# Patient Record
Sex: FEMALE | Race: OTHER
[De-identification: names, ages, dates, MRNs, and addresses within clinical notes are randomized per-mention and may not be internally consistent; named-entity substitution may affect disease eponyms.]

---

## 2018-01-15 NOTE — EMERGENCY ROOM REPORT
History of Present Illness


General


Chief Complaint:  Skin Rash/Abscess


Source:  Patient





Present Illness


HPI


16 YO Female presents to the ED c/o pain, swelling, and erythema  of lateral 

ear lobe status post recent ear piercing last week with inability to visualize 

tearing from both lobes.  Patient reports unable to digitalize since yesterday. 

Denies fevers, chills, discharge.  Patient reports bleeding.  Denies CP, 

Palpitations, LOC, AMS, dizziness, Changes in Vision, Sensation, paresthesias, 

or a sudden severe headache.


Allergies:  


Coded Allergies:  


     No Known Allergies (Unverified , 1/15/18)





Patient History


Past Medical History:  see triage record


Past Surgical History:  none


Pertinent Family History:  none


Immunizations:  UTD


Reviewed Nursing Documentation:  PMH: Agreed, PSxH: Agreed





Nursing Documentation-PMH


Past Medical History:  No Stated History





Review of Systems


All Other Systems:  negative except mentioned in HPI





Physical Exam





Vital Signs








  Date Time  Temp Pulse Resp B/P (MAP) Pulse Ox O2 Delivery O2 Flow Rate FiO2


 


1/15/18 15:29 98.6 57 20 117/66 99   








Sp02 EP Interpretation:  reviewed, normal


General Appearance:  no apparent distress, alert, GCS 15, non-toxic


Head:  normocephalic, atraumatic


Eyes:  bilateral eye normal inspection, bilateral eye PERRL


ENT:  hearing grossly normal, normal voice, other - swelling, erythema, 

tenderness, and palpable fb in the Soft tissue of the bilateral earlobes.


Neck:  full range of motion


Respiratory:  lungs clear, normal breath sounds, speaking full sentences


Cardiovascular #1:  regular rate, rhythm


Rectal:  deferred


Musculoskeletal:  back normal, gait/station normal, normal range of motion, non-

tender


Neurologic:  alert, oriented x3, responsive, motor strength/tone normal, 

sensory intact, normal gait, speech normal, grossly normal


Psychiatric:  judgement/insight normal


Skin:  normal color, no rash, warm/dry, well hydrated, other - swelling, 

erythema, tenderness, and palpable fb in the Soft tissue of the bilateral 

earlobes.


Lymphatic:  no adenopathy





Procedures


Additional Procedure


Procedure Narrative


- Verbal consent was obtained for fb removal from the bilateral earlobes. 


-EMLA  cream applied topically to the bilateral lobes


-Both earrings were removed from soft tissue using hemostats from suture kit no 

need for incisions.


-Pt. tolerate procedure well. 


- No complications





Medical Decision Making


PA Attestation


Dr. iraheta is my supervising Physician whom patient management has been 

discussed with.


Diagnostic Impression:  


 Primary Impression:  


 Foreign body in ear lobe


 Qualified Codes:  S00.459A - Superficial foreign body of unspecified ear, 

initial encounter


 Additional Impression:  


 Cellulitis


 Qualified Codes:  L03.818 - Cellulitis of other sites


ER Course


16 YO Female presents to the ED c/o pain, swelling, and erythema  of lateral 

ear lobe status post recent ear piercing last week with inability to visualize 

tearing from both lobes.  Patient reports unable to digitalize since yesterday. 

Denies fevers, chills, discharge.  Patient reports bleeding.  Denies CP, 

Palpitations, LOC, AMS, dizziness, Changes in Vision, Sensation, paresthesias, 

or a sudden severe headache.





Ddx considered but are not limited to cellulitis, abscess, cystic acne, 

necrotizing fasciitis, insect bite, soft tissue fb just to name a few. 





Vital signs: are WNL, pt. is afebrile





H&PE are most consistent with  soft tissue foreign body to the bilateral 

earlobes. 





ORDERS: none required at this time, the diagnosis is clinical





ED INTERVENTIONS:


-Pain meds


-EMLA  cream applied topically to the bilateral lobes


-Both earrings were removed from soft tissue using hemostats from suture kit no 

need for incisions. 


-Bacitracin applied by RN. 





DISCHARGE: At this time pt. is stable for d/c to home. Will provide printed 

patient care instructions, and any necessary prescriptions. Care plan and 

follow up instructions have been discussed with the patient prior to discharge.





Last Vital Signs








  Date Time  Temp Pulse Resp B/P (MAP) Pulse Ox O2 Delivery O2 Flow Rate FiO2


 


1/15/18 15:40 98.6  20 117/66 99   


 


1/15/18 15:29  57      








Disposition:  HOME, SELF-CARE


Condition:  Stable


Scripts


Cephalexin* (KEFLEX*) 500 Mg Capsule


500 MG ORAL EVERY 12 HOURS for 7 Days, #14 CAP 0 Refills


   Prov: Allyssa Mendenhall.GERARD         1/15/18 


Bacitracin/Polymyxin B Sulfate (BACITRACIN-POLYMYXIN OINTMENT) 28.35 Gm 

Oint...g.


1 APPLIC TP BID, #28.3 GM


   Prov: Allyssa Mendenhall         1/15/18


Referrals:  


[facility] (PCP)


Patient Instructions:  Cellulitis, Easy-to-Read





Additional Instructions:  


Take medications as directed. 


 ** Follow up with a Primary Care Provider in 3-5 days, even if your symptoms 

have resolved. ** 


--Please review list of primary care clinics, if you do not already have a 

primary care provider





Return sooner to ED if new symptoms occur, or current symptoms become worse. 











- Please note that this Emergency Department Report was dictated using [employer] technology software, occasionally this can lead to 

erroneous entry secondary to interpretation by the dictation equipment.











Allyssa Mendenhall James 15, 2018 16:47

## 2018-07-12 NOTE — EMERGENCY ROOM REPORT
History of Present Illness


General


Chief Complaint:  Earache


Source:  Patient





Present Illness


HPI


17-year-old female patient presents ER complaining of ear infection at site of 

ear piercing on left ear lobe.  Reports got her ear pierced 2 weeks ago, states 

she took out the earring 4 days ago and switch to another earring, reports 

attempted to change the earring again and began to experience symptoms. Reports 

applied hydrogen peroxide to the area.  Reports pain and swelling and redness 

on left earlobe since that time.  Denies fever, chest pain, shortness of 

breath.  Denies vomiting or other related symptoms. Does not know tetanus 

vaccination status.


Allergies:  


Coded Allergies:  


     No Known Allergies (Unverified , 1/15/18)





Patient History


Past Medical History:  see triage record


Last Menstrual Period:  unk


Pregnant Now:  No - birth control


Reviewed Nursing Documentation:  PMH: Agreed; PSxH: Agreed





Nursing Documentation-PMH


Past Medical History:  No Stated History





Review of Systems


All Other Systems:  negative except mentioned in HPI





Physical Exam





Vital Signs








  Date Time  Temp Pulse Resp B/P (MAP) Pulse Ox O2 Delivery O2 Flow Rate FiO2


 


7/12/18 19:20 98.0 64 16 91/51 95 Room Air  





 98.1       








Sp02 EP Interpretation:  reviewed, normal


General Appearance:  well appearing, no apparent distress, alert, GCS 15, non-

toxic


Head:  normocephalic, atraumatic


Eyes:  bilateral eye normal inspection, bilateral eye PERRL


ENT:  hearing grossly normal, normal pharynx, no angioedema, normal voice, TMs 

+ canals normal, uvula midline, moist mucus membranes, other - left ear lobe: 

Erythema and edema, no induration, no drainage, no bleeding, crusting and 

scabbing noted, TTP


Neck:  full range of motion


Respiratory:  lungs clear, normal breath sounds, no rhonchi, no respiratory 

distress, no accessory muscle use, no wheezing, speaking full sentences


Cardiovascular #1:  regular rate, rhythm, no edema


Musculoskeletal:  back normal, digits/nails normal, gait/station normal, normal 

range of motion, non-tender


Neurologic:  alert, oriented x3, responsive, motor strength/tone normal, 

sensory intact


Psychiatric:  mood/affect normal





Medical Decision Making


PA Attestation


Dr. Almonte is my supervising Physician whom patient management has been 

discussed with.


Diagnostic Impression:  


 Primary Impression:  


 Cellulitis


ER Course


Pt. presents to the ED c/o left ear pain





Ddx considered but are not limited to otitis media, otitis externa, cellulitis, 

abscess, abrasion, retained foreign body.





Vital signs: are WNL, pt. is afebrile





ER COURSE:


do not apply hydrogen peroxide to healing wound.


physical exam consistent with cellulitis of left earlobe.  No red streaking 

noted.  


Will clean the ear and apply bacitracin wound. Wound irrigated with copious 

amount of saline. 





While irrigating wound, small pocket of dried pus removed from ear showing 1-2 

cm open wound.  


Will pack wound with iodoform packing.  Patient started return to ER or go to 

PCP in 2 days for removal of  iodoform packing and wound check. 


Patient tolerated procedure well without any complications.


Keep wound clean and dry.


Will discharge patient home with topical and oral antibiotics.  


Follow-up with primary care provider in 2-3 days for wound check.  


contacts ENT and plastic surgeon physicians for further treatment, contact 

information for physicians provided.


Complete full course of antibiotics.  Return to ER for new or worsening of 

symptoms.


Patient does not know tetanus vaccination status, provided with tdap in ER.





DISCHARGE: 


Rx provided for Keflex


Rx provided for Bactroban





At this time pt is stable for d/c to home.  Patient is resting comfortably, in 

no acute distress, nontoxic appearing, talking without difficulty.


Patient to take medications as instructed


Will provide with patient care instructions and any necessary prescriptions.


Care plan and follow-up instructions provided.  


Patient instructed to follow-up with primary care provider in 2-3 days for 

wound check.


Patient questions asked and answered.


Patient reports understanding and agreement to treatment plan.


ER precautions given. Patient instructed to return to ER immediately for any 

new or worsening of symptoms including but not limited to increasing SOB, 

persistent fever, chest pain, intractable vomiting.





- Please note that this Emergency Department Report was dictated using [employer] technology software, occasionally this can lead to 

erroneous entry secondary to interpretation by the dictation equipment.





Last Vital Signs








  Date Time  Temp Pulse Resp B/P (MAP) Pulse Ox O2 Delivery O2 Flow Rate FiO2


 


7/12/18 19:20 98.0 64 16 91/51 95 Room Air  





 98.1       








Disposition:  HOME, SELF-CARE


Condition:  Stable


Scripts


Cephalexin* (KEFLEX*) 500 Mg Capsule


500 MG ORAL EVERY 12 HOURS, #14 CAP 0 Refills


   Prov: Wu Chambers P.A.         7/12/18 


Mupirocin Calcium (Bactroban) 15 Gm Cream..g.


1 APPLIC TOPIC THREE TIMES A DAY for 7 Days, #15 GM


   Prov: Wu Chambers         7/12/18


Patient Instructions:  Cellulitis, Easy-to-Read





Additional Instructions:  


Followup with primary care provider in 2-3 days.


Take medications as directed. 


Patient questions asked and answered.


ER precautions given, patient instructed to return to ER immediately for any 

new or worsening of symptoms.


Do not apply hydrogen peroxide to the ear.  


Do not get ear pierced until infection is cleared.  Return to ER for new or 

worsening of symptoms.











Wu Chambers Jul 12, 2018 19:43
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
## 2019-08-23 ENCOUNTER — HOSPITAL ENCOUNTER (EMERGENCY)
Dept: HOSPITAL 72 - EMR | Age: 19
Discharge: HOME | End: 2019-08-23
Payer: MEDICAID

## 2019-08-23 VITALS — SYSTOLIC BLOOD PRESSURE: 110 MMHG | DIASTOLIC BLOOD PRESSURE: 68 MMHG

## 2019-08-23 VITALS — HEIGHT: 64 IN | WEIGHT: 165 LBS | BODY MASS INDEX: 28.17 KG/M2

## 2019-08-23 DIAGNOSIS — K64.8: ICD-10-CM

## 2019-08-23 DIAGNOSIS — K59.00: Primary | ICD-10-CM

## 2019-08-23 PROCEDURE — 99283 EMERGENCY DEPT VISIT LOW MDM: CPT

## 2019-08-23 NOTE — EMERGENCY ROOM REPORT
History of Present Illness


General


Chief Complaint:  Constipation


Source:  Patient





Present Illness


HPI


18-year-old female presents to the emergency department complaining of 

constipation x3 days with very little production when attempting to have a 

bowel movement.  Patient reports very small hard stools she reports spotting 

some blood on the toilet paper after wiping.  Patient reports 9 out of 10 

severity acute rectal pain during bowel movements. last bowel movement was this 

am.  Patient denies nausea, vomiting, fevers, chills she denies history of 

hemorrhoids or constipation.  Patient denies personal or familial history of 

cancer.  She denies taking new medications.  No other aggravating or relieving 

factors. Denies black tarry stool. Pt. reports she is able to pass gas. Denies 

abdominal pain or tenderness. Denies gross blood in the toilet or obvious blood 

streaks in stool. denies hx of fissures/anal abscess'. Denies suspicion of 

pregnancy.


Allergies:  


Coded Allergies:  


     No Known Allergies (Unverified , 1/15/18)





Patient History


Past Medical History:  see triage record


Past Surgical History:  none


Pertinent Family History:  none


Pregnant Now:  No


Reviewed Nursing Documentation:  PMH: Agreed; PSxH: Agreed





Nursing Documentation-PMH


Past Medical History:  No Stated History





Review of Systems


All Other Systems:  negative except mentioned in HPI





Physical Exam





Vital Signs








  Date Time  Temp Pulse Resp B/P (MAP) Pulse Ox O2 Delivery O2 Flow Rate FiO2


 


8/23/19 12:21 98.2 87 18 109/66 (80) 99 Room Air  








Sp02 EP Interpretation:  reviewed, normal


General Appearance:  no apparent distress, alert, GCS 15, non-toxic


Head:  normocephalic, atraumatic


Eyes:  bilateral eye normal inspection, bilateral eye PERRL


ENT:  hearing grossly normal, normal voice


Neck:  full range of motion


Respiratory:  chest non-tender, lungs clear, normal breath sounds, speaking 

full sentences


Cardiovascular #1:  regular rate, rhythm


Gastrointestinal:  normal bowel sounds, non tender, soft, non-distended, no 

guarding


Rectal:  other - NO visible hemorrhoids, no visible blood/bleeding, no visible 

tears.


Genitourinary:  normal inspection, no CVA tenderness


Musculoskeletal:  back normal, gait/station normal, normal range of motion, non-

tender


Neurologic:  alert, oriented x3, responsive, motor strength/tone normal, 

sensory intact, speech normal, grossly normal


Psychiatric:  judgement/insight normal


Lymphatic:  no adenopathy





Medical Decision Making


PA Attestation


Dr. Howe Is my supervising Physician whom patient management has been 

discussed with.


Diagnostic Impression:  


 Primary Impression:  


 Constipation


 Qualified Codes:  K59.00 - Constipation, unspecified


 Additional Impression:  


 Internal hemorrhoid, bleeding


ER Course


18-year-old female presents to the emergency department complaining of 

constipation x3 days with very little production when attempting to have a 

bowel movement.  Patient reports very small hard stools she reports spotting 

some blood on the toilet paper after wiping.  Patient reports 9 out of 10 

severity acute rectal pain during bowel movements. last bowel movement was this 

am.  Patient denies nausea, vomiting, fevers, chills she denies history of 

hemorrhoids or constipation.  Patient denies personal or familial history of 

cancer.  She denies taking new medications.  No other aggravating or relieving 

factors. Denies black tarry stool. Pt. reports she is able to pass gas. Denies 

abdominal pain or tenderness. Denies gross blood in the toilet or obvious blood 

streaks in stool. denies hx of fissures/anal abscess'. Denies suspicion of 

pregnancy.





Ddx considered but are not limited to constipation , appendicitis, SBO, ectopic 

pregnancy, PID, tubo-ovarian abscess, cancer, anal fissure, hemorrhoids, rectal 

tear.





Vital signs: are WNL, pt. is afebrile


H&PE are most consistent with constipation. bowl sounds are normo active. Pt. 

NAD, non-toxic in appearance, benign rectal exam. 





ORDERS: dx was made clinically





ED INTERVENTIONS: None at this time. 





-I do not identify an emergent condition at this time. With current presentation

,  pt. is stable for close outpatient follow up and conservative treatment.  D/

w pt. to return promptly to ED with worsening or new symptoms.- Pt. verbalizes' 

understanding and agreement with proposed treatment plan.





D/W pt. conservative treatment as outpatient at home with laxative and stool 

softeners as well as rectal cream. ED return precautions were given and 

emphasized. 








DISCHARGE: At this time pt. is stable for d/c to home. Will provide printed 

patient care instructions, and any necessary prescriptions. Care plan and 

follow up instructions have been discussed with the patient prior to discharge.





Last Vital Signs








  Date Time  Temp Pulse Resp B/P (MAP) Pulse Ox O2 Delivery O2 Flow Rate FiO2


 


8/23/19 12:21 98.2 87 18 109/66 (80) 99 Room Air  








Status:  improved


Disposition:  HOME, SELF-CARE


Condition:  Stable


Scripts


Lidocaine (Lidocaine) 5 Gm Cream..g.


1 APPLIC TP TID for pain, #5 GM


   Prov: Allyssa Mendenhall         8/23/19 


Hydrocortisone Hc 2.5% Cream (ANUSOL-HC 2.5% CREAM) Y Cr


1 APPLIC RC TID, #30 GM


   Prov: Allyssa Mendenhall         8/23/19 


Docusate Calcium (STOOL SOFTENER) 240 Mg Capsule


240 MG PO TID, #30 CAP


   Prov: Allyssa Mendenhall         8/23/19 


Lactulose (LACTULOSE*) 20 Gm/30 Ml Solution


30 ML ORAL BID, #180 ML 0 Refills


   Prov: Allyssa Mendenhall         8/23/19


Referrals:  


[facility] (PCP)


Patient Instructions:  Constipation, Adult, Hemorrhoids, Easy-to-Read





Additional Instructions:  


Take medications as directed. 





 ** Follow up with a Primary Care Provider in 3-5 days, even if your symptoms 

have resolved. ** 


--Please review list of primary care clinics, if you do not already have a 

primary care provider





Return sooner to ED if new symptoms occur, or current symptoms become worse. 














- Please note that this Emergency Department Report was dictated using [employer] technology software, occasionally this can lead to 

erroneous entry secondary to interpretation by the dictation equipment.











Allyssa Mendenhall Aug 23, 2019 14:05